# Patient Record
Sex: FEMALE | Race: WHITE | NOT HISPANIC OR LATINO | URBAN - METROPOLITAN AREA
[De-identification: names, ages, dates, MRNs, and addresses within clinical notes are randomized per-mention and may not be internally consistent; named-entity substitution may affect disease eponyms.]

---

## 2024-02-28 ENCOUNTER — OFFICE VISIT (OUTPATIENT)
Dept: URGENT CARE | Facility: CLINIC | Age: 89
End: 2024-02-28
Payer: MEDICARE

## 2024-02-28 VITALS
HEART RATE: 88 BPM | WEIGHT: 98 LBS | DIASTOLIC BLOOD PRESSURE: 87 MMHG | SYSTOLIC BLOOD PRESSURE: 172 MMHG | RESPIRATION RATE: 16 BRPM | OXYGEN SATURATION: 97 % | TEMPERATURE: 97 F | BODY MASS INDEX: 20.57 KG/M2 | HEIGHT: 58 IN

## 2024-02-28 DIAGNOSIS — M79.601 PAIN OF RIGHT UPPER EXTREMITY: Primary | ICD-10-CM

## 2024-02-28 PROCEDURE — 1036F TOBACCO NON-USER: CPT | Performed by: PHYSICIAN ASSISTANT

## 2024-02-28 PROCEDURE — 1159F MED LIST DOCD IN RCRD: CPT | Performed by: PHYSICIAN ASSISTANT

## 2024-02-28 PROCEDURE — 99204 OFFICE O/P NEW MOD 45 MIN: CPT | Performed by: PHYSICIAN ASSISTANT

## 2024-02-28 PROCEDURE — 1125F AMNT PAIN NOTED PAIN PRSNT: CPT | Performed by: PHYSICIAN ASSISTANT

## 2024-02-28 RX ORDER — LEVOTHYROXINE SODIUM 88 UG/1
TABLET ORAL
COMMUNITY
Start: 2024-01-24

## 2024-02-28 RX ORDER — PREDNISONE 10 MG/1
30 TABLET ORAL DAILY
Qty: 15 TABLET | Refills: 0 | Status: SHIPPED | OUTPATIENT
Start: 2024-02-28 | End: 2024-03-04

## 2024-02-28 RX ORDER — OMEPRAZOLE 20 MG/1
CAPSULE, DELAYED RELEASE ORAL
COMMUNITY
Start: 2024-02-25

## 2024-02-28 ASSESSMENT — PAIN SCALES - GENERAL: PAINLEVEL: 8

## 2024-02-28 NOTE — PATIENT INSTRUCTIONS
Assessment/Plan   Problem List Items Addressed This Visit       Pain of right upper extremity - Primary    Relevant Medications    predniSONE (Deltasone) 10 mg tablet      I discussed with patient at length today that I suspect that her symptoms are actually cervical radiculopathy this is secondary to a pinched nerve in the cervical spine that is leading to the sensation of pain down the right arm.  I am sending a short burst of prednisone for the patient and I am recommending follow-up with primary care    The patient has no findings by history or physical exam to suggest upper extremity DVT thrombophlebitis the physical exam shows no erythema no palpable cords radial pulses are 2+ bilaterally there is no swelling of the right upper extremity range of motion strength and sensation are all intact at time of exam.  Recommending patient may continue using the Aspercreme if this seems to help but I am highly recommending we trial a course of prednisone to reduce the radicular symptoms

## 2024-02-28 NOTE — PROGRESS NOTES
"Subjective   Patient ID: Dora Jacinto is a 89 y.o. female who presents for Arm Pain (Right arm pain and numbness x3 months after having blood drawn.).  Patient notes that the pain is intermittent she does not seem to associate any particular activity or exacerbating factors, though she does note some mornings are worse than others.  She notes that the pain radiates from the posterior shoulder along the ventral aspect of the upper arm and into the ventral aspect of the forearm.  Describes a burning, tight feeling.  She notes that she has been using Aspercreme without significant relief.  This has been ongoing now for 3 months some days worse some days better but overall the patient notes that the trajectory has gotten worse over the last 3 months.  She denies a sensation of weakness in the right upper extremity.  No rashes to the right upper extremity no swelling.  No fevers or chills.  She denies any associated chest pain or shortness of breath.  Patient has an underlying medical history of hypothyroidism and acid reflux takes Synthroid and omeprazole.  She otherwise denies any trauma to the arm but does wonder if perhaps the blood draw that occurred 3 months because the pain as she notes that the phlebotomist that was taking her blood had difficulty.    The remainder of the systems were reviewed and are negative unless noted above      Objective   /87   Pulse 88   Temp 36.1 °C (97 °F) (Temporal)   Resp 16   Ht 1.473 m (4' 10\")   Wt (!) 44.5 kg (98 lb)   SpO2 97%   BMI 20.48 kg/m²   Physical Exam  Constitutional:       General: She is not in acute distress.     Appearance: Normal appearance. She is not ill-appearing, toxic-appearing or diaphoretic.   HENT:      Head: Normocephalic and atraumatic.      Mouth/Throat:      Mouth: Mucous membranes are moist.      Pharynx: Oropharynx is clear.   Eyes:      Conjunctiva/sclera: Conjunctivae normal.   Cardiovascular:      Rate and Rhythm: Normal rate and " regular rhythm.      Pulses:           Radial pulses are 2+ on the right side and 2+ on the left side.      Heart sounds: No murmur heard.  Pulmonary:      Effort: Pulmonary effort is normal. No respiratory distress.      Breath sounds: Normal breath sounds. No wheezing.   Musculoskeletal:         General: No swelling, tenderness, deformity or signs of injury. Normal range of motion.      Cervical back: Normal range of motion and neck supple.      Comments: There is no tenderness appreciated with palpation over the right upper extremity.  Range of motion is intact strength is intact sensation is intact to light touch   Skin:     General: Skin is warm and dry.      Findings: No erythema or rash.      Comments: There is no erythema or rash overlying the area of the patient's complaint   Neurological:      General: No focal deficit present.      Mental Status: She is alert and oriented to person, place, and time.      Gait: Gait normal.         Assessment/Plan   Problem List Items Addressed This Visit       Pain of right upper extremity - Primary    Relevant Medications    predniSONE (Deltasone) 10 mg tablet      I discussed with patient at length today that I suspect that her symptoms are actually cervical radiculopathy this is secondary to a pinched nerve in the cervical spine that is leading to the sensation of pain down the right arm.  I am sending a short burst of prednisone for the patient and I am recommending follow-up with primary care    The patient has no findings by history or physical exam to suggest upper extremity DVT thrombophlebitis the physical exam shows no erythema no palpable cords radial pulses are 2+ bilaterally there is no swelling of the right upper extremity range of motion strength and sensation are all intact at time of exam.  Recommending patient may continue using the Aspercreme if this seems to help but I am highly recommending we trial a course of prednisone to reduce the radicular  symptoms

## 2024-09-03 LAB
NON-UH HIE A/G RATIO: 1.3
NON-UH HIE ALB: 3.9 G/DL (ref 3.4–5)
NON-UH HIE ALK PHOS: 85 UNIT/L (ref 45–117)
NON-UH HIE BILIRUBIN, TOTAL: 0.4 MG/DL (ref 0.3–1.2)
NON-UH HIE BUN/CREAT RATIO: 15.6
NON-UH HIE BUN: 14 MG/DL (ref 9–23)
NON-UH HIE CALCIUM, IONIZED: 1.01 MMOL/L (ref 1.12–1.32)
NON-UH HIE CALCIUM: 8.2 MG/DL (ref 8.7–10.4)
NON-UH HIE CALCULATED OSMOLALITY: 287 MOSM/KG (ref 275–295)
NON-UH HIE CHLORIDE: 107 MMOL/L (ref 98–107)
NON-UH HIE CO2, VENOUS: 27 MMOL/L (ref 20–31)
NON-UH HIE CREATININE: 0.9 MG/DL (ref 0.5–0.8)
NON-UH HIE GFR AA: >60
NON-UH HIE GLOBULIN: 3.1 G/DL
NON-UH HIE GLOMERULAR FILTRATION RATE: 58 ML/MIN/1.73M?
NON-UH HIE GLUCOSE: 85 MG/DL (ref 74–106)
NON-UH HIE GOT: 28 UNIT/L (ref 15–37)
NON-UH HIE GPT: 15 UNIT/L (ref 10–49)
NON-UH HIE I-PTH: 57 PG/ML (ref 18.4–80.1)
NON-UH HIE K: 4 MMOL/L (ref 3.5–5.1)
NON-UH HIE NA: 144 MMOL/L (ref 135–145)
NON-UH HIE TOTAL PROTEIN: 7 G/DL (ref 5.7–8.2)
NON-UH HIE TSH: 1.39 UIU/ML (ref 0.55–4.78)
NON-UH HIE VIT D 25: 68 NG/ML

## 2024-09-19 LAB — NON-UH HIE THYROGLOBULIN: NORMAL

## 2025-03-03 LAB — NON-UH HIE THYROGLOBULIN: NORMAL
